# Patient Record
Sex: MALE | Race: WHITE
[De-identification: names, ages, dates, MRNs, and addresses within clinical notes are randomized per-mention and may not be internally consistent; named-entity substitution may affect disease eponyms.]

---

## 2017-08-01 ENCOUNTER — HOSPITAL ENCOUNTER (EMERGENCY)
Dept: HOSPITAL 10 - FTE | Age: 62
Discharge: HOME | End: 2017-08-01
Payer: MEDICAID

## 2017-08-01 VITALS
HEIGHT: 63 IN | BODY MASS INDEX: 30.86 KG/M2 | WEIGHT: 174.17 LBS | WEIGHT: 174.17 LBS | BODY MASS INDEX: 30.86 KG/M2 | HEIGHT: 63 IN
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
## 2017-08-01 DIAGNOSIS — M70.22: Primary | ICD-10-CM

## 2017-08-01 PROCEDURE — 99283 EMERGENCY DEPT VISIT LOW MDM: CPT

## 2017-08-01 NOTE — ERD
ER Documentation


Chief Complaint


Date/Time


DATE: 8/1/17 


TIME: 09:35


Chief Complaint


LEFT ELBOW PAIN/SWELLING





HPI


Patient is a 61-year-old male who presents to the emergency department for left 

elbow pain and swelling 1 week.  Patient states that the swelling was worse.  

Patient states he has been using Arnica paste to the affected elbow.  Patient 

does report significant improvement using the space.  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 

does lift heavy furniture for work.  Patient denies any falls or trauma.  

Patient has no redness, warmth or lesions.  Patient denies any fevers, chills, 

nausea, vomiting, chest pain, shortness breath or loss consciousness.  Patient 

is left-hand dominant.  Patient denies any previous injuries to the affected 

extremity.





ROS


All systems reviewed and are negative except as per history of present illness.





Medications


Home Meds


Active Scripts


Ibuprofen* (Motrin*) 600 Mg Tab, 600 MG PO Q6, #30 TAB


   Prov:ALBERTO BERKOWITZ PA-C         8/1/17





PMhx/Soc


Medical and Surgical Hx:  pt denies Medical Hx, pt denies Surgical Hx


History of Surgery:  No


Anesthesia Reaction:  No


Hx Alcohol Use:  No


Hx Substance Use:  No


Hx Tobacco Use:  No





FmHx


Family History:  No diabetes





Physical Exam


Vitals





Vital Signs








  Date Time  Temp Pulse Resp B/P Pulse Ox O2 Delivery O2 Flow Rate FiO2


 


8/1/17 09:14 98.1 90 18 140/89 99   








Physical Exam


GENERAL: Well-developed, well-nourished male. Appears in no acute distress. 


HEAD: Normocephalic, atraumatic. 


EYES: Pupils are equally reactive bilaterally. EOMs grossly intact. No 

conjunctival erythema. 


ENT: Moist mucous membranes. No uvula deviation. No kissing tonsils. 


NECK: Supple. No meningismus. Normal range of motion of the neck.


LUNG: Clear to auscultation bilaterally. No rhonchi, wheezing, rales or coarse 

breath sounds. 


HEART: Regular rate and rhythm. No murmurs, rubs or gallops.


EXTREMITIES: Equal pulses bilaterally. No peripheral clubbing, cyanosis or 

edema. No unilateral leg swelling.


NEUROLOGIC: Alert and oriented. Moving all four extremities without any 

difficulty. Normal speech. Steady gait. 


SKIN: Normal color. Warm and dry. No rashes or lesions.


LEFT ELBOW: No deformity, erythema, ecchymosis noted.  Slight bursal swelling 

of the elbow noted.  No erythema, warmth.  No lymphatic streaking.  Skin 

intact.  Normal range of motion of the elbow, shoulder and wrist.  Patient able 

to supinate and pronate without any difficulty.. Non-tender to palpation of the 

humerus, forearm.  Slightly tender to palpation of the bursa. Sensation intact 

to light touch. Neurovascularly intact. (Able to give thumbs up, make an ok sign

, cross digits 2 and 3, thumb to pinky opposition. 2+ RP.) No snuffbox 

tenderness.


Results 24 hrs





 Current Medications








 Medications


  (Trade)  Dose


 Ordered  Sig/Teo


 Route


 PRN Reason  Start Time


 Stop Time Status Last Admin


Dose Admin


 


 Ketorolac


 Tromethamine


  (Toradol)  30 mg  ONCE  STAT


 IM


   8/1/17 09:32


 8/1/17 09:34 DC  


 











Procedures/MDM


ED COURSE:


The patient was stable throughout ED course. I kept the patient and/or family 

informed of laboratory and diagnostic imaging results throughout the ED course.

  





MEDICATIONS GIVEN: 


Patient was offered Toradol IM however he declined.











MEDICAL DECISION MAKING:


This is a 61-year-old male presents with left elbow pain 1 week.  Patient does 

admit to lifting heavy furniture at work.  Patient denies any falls or trauma.  

Patient has been using Arnica paste to the affected area with significant 

alleviation of the swelling.. Vital signs were reviewed. Patient was afebrile.  

The patient denied any trauma or falls, there is no indication for x-ray 

imaging at this time.  Patient normal range of motion of the elbow. Given these 

findings, the patients presentation is most consistent with bursitis. I have a 

much lower clinical concern for elbow dislocation, radial head fracture, 

olecranon fracture, capitellum fracture, humerus fracture, septic joint, biceps 

tendon rupture, osteomyelitis, rheumatoid arthritis, osteoarthritis or 

compartment syndrome.





PRESCRIPTIONS:


Ibuprofen





DISCHARGE:


At this time, patient is stable for discharge and outpatient management. RICE 

therapy and ROM exercises were advised to avoid stiffness. I have instructed 

the patient to follow-up with his/her primary care physician in 1-2 days. I 

have discussed with the patient the possibility of needing to see an orthopedic 

specialist for further workup and imaging if the pain persists. I have 

instructed the patient to promptly return to the ER for any new or worsening 

symptoms including increased pain, swelling, redness, warmth or fever. The 

patient and/or family expressed understanding of and agreement with this plan. 

All questions were answered. Home care instructions were provided. 








Patients blood pressure was elevated (>120/80) but appears stable without 

evidence of hypertensive emergency, hypertensive urgency or end-organ failure. 

I had discussion with the patient about the risks of hypertension. I have 

advised the patient to follow up with his/her primary care physician for 

outpatient monitoring and treatment for hypertension in 2-3 days. I have 

instructed the patient to return to the ER for any new or worsening symptoms 

including chest pain, shortness of breath, headache, blurred vision, confusion, 

nausea, vomiting or LOC.





Departure


Diagnosis:  


 Primary Impression:  


 Bursitis


 Bursitis location:  elbow  Elbow bursitis location:  olecranon bursitis  

Laterality:  left  Qualified Code:  M70.22 - Olecranon bursitis of left elbow


Condition:  Stable


Patient Instructions:  Bursitis, Elbow (Olecranon)


Referrals:  


[facility]


YOU HAVE RECEIVED A MEDICAL SCREENING EXAM AND THE RESULTS INDICATE THAT YOU DO 

NOT HAVE A CONDITION THAT REQUIRES URGENT TREATMENT IN THE EMERGENCY DEPARTMENT.





FURTHER EVALUATION AND TREATMENT OF YOUR CONDITION CAN WAIT UNTIL YOU ARE SEEN 

IN YOUR DOCTORS OFFICE WITHIN THE NEXT 1-2 DAYS. IT IS YOUR RESPONSIBILITY TO 

MAKE AN APPOINTMENT FOR FOL-UP CARE.





IF YOU HAVE A PRIMARY DOCTOR


--you should call your primary doctor and schedule an appointment





IF YOU DO NOT HAVE A PRIMARY DOCTOR YOU CAN CALL OUR PHYSICIAN REFERRAL HOTLINE 

AT


 [phone] 





IF YOU CAN NOT AFFORD TO SEE A PHYSICIAN YOU CAN CHOSE FROM THE FOLLOWING 

[facility] CLINICS





[facility] [phone] [address] [facility]. [facility] [phone] [address] [facility]. [facility] [phone] [address] [facility] [phone] [address] [facility] [facility] [phone] [address] [facility]. [facility]. [phone] [address] [facility]


YOU HAVE RECEIVED A MEDICAL SCREENING EXAM AND THE RESULTS INDICATE THAT YOU DO 

NOT HAVE A CONDITION THAT REQUIRES URGENT TREATMENT IN THE EMERGENCY DEPARTMENT.





FURTHER EVALUATION AND TREATMENT OF YOUR CONDITION CAN WAIT UNTIL YOU ARE SEEN 

IN YOUR DOCTORS OFFICE WITHIN THE NEXT 1-2 DAYS. IT IS YOUR RESPONSIBILITY TO 

MAKE AN APPOINTMENT FOR FOLOW-UP CARE.





IF YOU HAVE A PRIMARY DOCTOR


--you should call your primary doctor and schedule and appointment





IF YOU DO NOT HAVE A PRIMARY DOCTOR YOU CAN CALL OUR PHYSICIAN REFERRAL HOTLINE 

AT [phone].





IF YOU CAN NOT AFFORD TO SEE A PHYSICIAN YOU CAN CHOSE FROM THE FOLLOWING 

[facility] INSTITUTIONS:





[facility]


[address] [place] [address]





[facility]


[address] [place] [address]





[facility]


[address] [place] [address]





[facility]


Hours: Mon-Fri


9:00 AM - 5:00 PM





Additional Instructions:  


Call your primary care doctor TOMORROW for an appointment during the next 1-2 

days.See the doctor sooner or return here if your condition worsens before your 

appointment time.











ALBERTO BERKOWITZ PA-C Aug 1, 2017 09:40





ALBERTO BERKOWITZ PA-C Aug 1, 2017 09:40

## 2018-08-01 ENCOUNTER — HOSPITAL ENCOUNTER (EMERGENCY)
Dept: HOSPITAL 91 - FTE | Age: 63
Discharge: HOME | End: 2018-08-01
Payer: SELF-PAY

## 2018-08-01 ENCOUNTER — HOSPITAL ENCOUNTER (EMERGENCY)
Age: 63
Discharge: HOME | End: 2018-08-01

## 2018-08-01 DIAGNOSIS — I10: ICD-10-CM

## 2018-08-01 DIAGNOSIS — S62.511A: Primary | ICD-10-CM
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
## 2018-08-01 PROCEDURE — 99283 EMERGENCY DEPT VISIT LOW MDM: CPT

## 2018-08-01 PROCEDURE — 73130 X-RAY EXAM OF HAND: CPT

## 2018-08-01 PROCEDURE — 29125 APPL SHORT ARM SPLINT STATIC: CPT
